# Patient Record
Sex: FEMALE | Race: WHITE | Employment: OTHER | URBAN - METROPOLITAN AREA
[De-identification: names, ages, dates, MRNs, and addresses within clinical notes are randomized per-mention and may not be internally consistent; named-entity substitution may affect disease eponyms.]

---

## 2018-09-24 ENCOUNTER — HOSPITAL ENCOUNTER (EMERGENCY)
Age: 78
Discharge: HOME | End: 2018-09-25
Attending: EMERGENCY MEDICINE
Payer: MEDICARE

## 2018-09-24 ENCOUNTER — APPOINTMENT (OUTPATIENT)
Dept: GENERAL RADIOLOGY | Age: 78
End: 2018-09-24
Payer: MEDICARE

## 2018-09-24 VITALS
SYSTOLIC BLOOD PRESSURE: 148 MMHG | RESPIRATION RATE: 18 BRPM | WEIGHT: 278 LBS | BODY MASS INDEX: 51.16 KG/M2 | DIASTOLIC BLOOD PRESSURE: 68 MMHG | OXYGEN SATURATION: 95 % | HEIGHT: 62 IN | HEART RATE: 76 BPM | TEMPERATURE: 98.7 F

## 2018-09-24 DIAGNOSIS — J40 BRONCHITIS: Primary | ICD-10-CM

## 2018-09-24 LAB
ALBUMIN SERPL-MCNC: 3.1 G/DL (ref 3.5–5.2)
ALP BLD-CCNC: 68 U/L (ref 35–104)
ALT SERPL-CCNC: 16 U/L (ref 0–32)
ANION GAP SERPL CALCULATED.3IONS-SCNC: 9 MMOL/L (ref 7–16)
AST SERPL-CCNC: 22 U/L (ref 0–31)
BASOPHILS ABSOLUTE: 0.03 E9/L (ref 0–0.2)
BASOPHILS RELATIVE PERCENT: 0.4 % (ref 0–2)
BILIRUB SERPL-MCNC: 0.2 MG/DL (ref 0–1.2)
BUN BLDV-MCNC: 29 MG/DL (ref 8–23)
CALCIUM SERPL-MCNC: 8.7 MG/DL (ref 8.6–10.2)
CHLORIDE BLD-SCNC: 102 MMOL/L (ref 98–107)
CO2: 28 MMOL/L (ref 22–29)
CREAT SERPL-MCNC: 1.2 MG/DL (ref 0.5–1)
EOSINOPHILS ABSOLUTE: 0.48 E9/L (ref 0.05–0.5)
EOSINOPHILS RELATIVE PERCENT: 5.6 % (ref 0–6)
GFR AFRICAN AMERICAN: 52
GFR NON-AFRICAN AMERICAN: 43 ML/MIN/1.73
GLUCOSE BLD-MCNC: 82 MG/DL (ref 74–109)
HCT VFR BLD CALC: 32 % (ref 34–48)
HEMOGLOBIN: 9.3 G/DL (ref 11.5–15.5)
IMMATURE GRANULOCYTES #: 0.05 E9/L
IMMATURE GRANULOCYTES %: 0.6 % (ref 0–5)
INR BLD: 2.9
LYMPHOCYTES ABSOLUTE: 2.21 E9/L (ref 1.5–4)
LYMPHOCYTES RELATIVE PERCENT: 25.8 % (ref 20–42)
MCH RBC QN AUTO: 23.7 PG (ref 26–35)
MCHC RBC AUTO-ENTMCNC: 29.1 % (ref 32–34.5)
MCV RBC AUTO: 81.4 FL (ref 80–99.9)
MONOCYTES ABSOLUTE: 0.76 E9/L (ref 0.1–0.95)
MONOCYTES RELATIVE PERCENT: 8.9 % (ref 2–12)
NEUTROPHILS ABSOLUTE: 5.02 E9/L (ref 1.8–7.3)
NEUTROPHILS RELATIVE PERCENT: 58.7 % (ref 43–80)
PDW BLD-RTO: 15.3 FL (ref 11.5–15)
PLATELET # BLD: 285 E9/L (ref 130–450)
PMV BLD AUTO: 11.3 FL (ref 7–12)
POTASSIUM SERPL-SCNC: 4.3 MMOL/L (ref 3.5–5)
PROTHROMBIN TIME: 32.9 SEC (ref 9.3–12.4)
RBC # BLD: 3.93 E12/L (ref 3.5–5.5)
SODIUM BLD-SCNC: 139 MMOL/L (ref 132–146)
STREP GRP A PCR: NEGATIVE
TOTAL PROTEIN: 7.1 G/DL (ref 6.4–8.3)
WBC # BLD: 8.6 E9/L (ref 4.5–11.5)

## 2018-09-24 PROCEDURE — 99283 EMERGENCY DEPT VISIT LOW MDM: CPT

## 2018-09-24 PROCEDURE — 6370000000 HC RX 637 (ALT 250 FOR IP): Performed by: EMERGENCY MEDICINE

## 2018-09-24 PROCEDURE — 80053 COMPREHEN METABOLIC PANEL: CPT

## 2018-09-24 PROCEDURE — 87880 STREP A ASSAY W/OPTIC: CPT

## 2018-09-24 PROCEDURE — 85610 PROTHROMBIN TIME: CPT

## 2018-09-24 PROCEDURE — 71046 X-RAY EXAM CHEST 2 VIEWS: CPT

## 2018-09-24 PROCEDURE — 94664 DEMO&/EVAL PT USE INHALER: CPT

## 2018-09-24 PROCEDURE — 36415 COLL VENOUS BLD VENIPUNCTURE: CPT

## 2018-09-24 PROCEDURE — 85025 COMPLETE CBC W/AUTO DIFF WBC: CPT

## 2018-09-24 RX ORDER — IPRATROPIUM BROMIDE AND ALBUTEROL SULFATE 2.5; .5 MG/3ML; MG/3ML
3 SOLUTION RESPIRATORY (INHALATION) ONCE
Status: COMPLETED | OUTPATIENT
Start: 2018-09-24 | End: 2018-09-24

## 2018-09-24 RX ORDER — ALBUTEROL SULFATE 90 UG/1
2 AEROSOL, METERED RESPIRATORY (INHALATION) EVERY 6 HOURS PRN
Qty: 1 INHALER | Refills: 0 | Status: SHIPPED | OUTPATIENT
Start: 2018-09-24 | End: 2020-01-01

## 2018-09-24 RX ADMIN — IPRATROPIUM BROMIDE AND ALBUTEROL SULFATE 3 AMPULE: .5; 3 SOLUTION RESPIRATORY (INHALATION) at 17:01

## 2018-09-24 ASSESSMENT — ENCOUNTER SYMPTOMS
TROUBLE SWALLOWING: 0
SORE THROAT: 1
COUGH: 1
RHINORRHEA: 0
EYE DISCHARGE: 0
SHORTNESS OF BREATH: 0
WHEEZING: 1
NAUSEA: 0
COLOR CHANGE: 0
VOMITING: 0
STRIDOR: 0
SINUS CONGESTION: 1
ABDOMINAL PAIN: 0
SINUS PRESSURE: 1

## 2018-09-24 NOTE — ED NOTES
Unable to obtain IV access for meds/blood work.  Charge nurse notified     Brooks Bryson, NANCY  09/24/18 8607

## 2018-09-24 NOTE — ED PROVIDER NOTES
Ear: Tympanic membrane and ear canal normal.   Left Ear: Tympanic membrane and ear canal normal.   Mouth/Throat: Oropharynx is clear and moist. No oropharyngeal exudate. Eyes: Conjunctivae are normal.   Neck: Neck supple. Cardiovascular: Normal rate, regular rhythm, normal heart sounds and intact distal pulses. Pulses:       Radial pulses are 2+ on the right side, and 2+ on the left side. Pulmonary/Chest: Effort normal. No respiratory distress. She has wheezes. She has rhonchi. She has no rales. She exhibits no tenderness. Abdominal: Soft. Bowel sounds are normal. There is no tenderness. There is no rebound. Neurological: She is alert and oriented to person, place, and time. Skin: Skin is warm and dry. She is not diaphoretic. Psychiatric: She has a normal mood and affect. Her behavior is normal.   Nursing note and vitals reviewed. Procedures    MDM  Number of Diagnoses or Management Options  Bronchitis:   Diagnosis management comments: Patient presents to the ED for cough. Differential diagnoses included but not limited to pneumonia, bronchitis, fluids and strep pharyngitis. Workup in the ED revealed bronchitis. Patient refused influenza swab. Patient was given breathing treatment for their symptoms with moderate improvement. Blood work and imaging unremarkable. Patient continues to be non-toxic on re-evaluation. Findings were discussed with the patient and reasons to immediately return to the ED were articulated to them. They will follow-up with their PMD.         Amount and/or Complexity of Data Reviewed  Decide to obtain previous medical records or to obtain history from someone other than the patient: yes        ED Course as of Sep 25 0230   Mon Sep 24, 2018   1729 Patient reassessed. Patient states minimal improvement after breathing treatment. Patient in no respiratory distress. She is not using any accessory muscle use.  Lung auscultation demonstrates diminished wheeze in comparison to initial evaluation. [MA]   1956 She reassessed. Patient aware of current results. Patient distress at this time and hemodynamically stable. Patient will be discharged home. [MA]      ED Course User Index  [MA] eLo Joiner, DO       --------------------------------------------- PAST HISTORY ---------------------------------------------  Past Medical History:  has a past medical history of Arthritis; Diabetes mellitus [address] [facility] Hyperlipidemia; and Hypertension. Past Surgical History:  has a past surgical history that includes Tonsillectomy; Knee Arthroplasty (Left, 02/06/2014); and Vena Cava Filter Placement (02/06/2014). Social History:  reports that she has never smoked. She has never used smokeless tobacco. She reports that she does not drink alcohol or use drugs. Family History: family history is not on file. The patients home medications have been reviewed.     Allergies: Sulfa antibiotics    -------------------------------------------------- RESULTS -------------------------------------------------    Lab  Results for orders placed or performed during the hospital encounter of 09/24/18   Strep screen group a throat   Result Value Ref Range    Strep Grp A PCR Negative Negative   CBC auto differential   Result Value Ref Range    WBC 8.6 4.5 - 11.5 E9/L    RBC 3.93 3.50 - 5.50 E12/L    Hemoglobin 9.3 (L) 11.5 - 15.5 g/dL    Hematocrit 32.0 (L) 34.0 - 48.0 %    MCV 81.4 80.0 - 99.9 fL    MCH 23.7 (L) 26.0 - 35.0 pg    MCHC 29.1 (L) 32.0 - 34.5 %    RDW 15.3 (H) 11.5 - 15.0 fL    Platelets 159 635 - 616 E9/L    MPV 11.3 7.0 - 12.0 fL    Neutrophils % 58.7 43.0 - 80.0 %    Immature Granulocytes % 0.6 0.0 - 5.0 %    Lymphocytes % 25.8 20.0 - 42.0 %    Monocytes % 8.9 2.0 - 12.0 %    Eosinophils % 5.6 0.0 - 6.0 %    Basophils % 0.4 0.0 - 2.0 %    Neutrophils # 5.02 1.80 - 7.30 E9/L    Immature Granulocytes # 0.05 E9/L    Lymphocytes # 2.21 1.50 - 4.00 E9/L    Monocytes #

## 2018-09-24 NOTE — ED NOTES
Bed:  [place] [address]  Expected date:   Expected time:   Means of arrival:   Comments:  Dinora Piper RN  09/24/18 9811

## 2018-09-24 NOTE — ED NOTES
Pt refuses nasal swab. Dr. Anibal Myers and Dr. Rajat Messina notified.      Brooks Bryson RN  09/24/18 9114

## 2018-09-25 NOTE — ED NOTES
Spoke to Mary Latham at [facility] at [employer]. She states [facility] cannot pick pt up to transport back to facility. Mary Latham stated she has arranged for [employer] to pick pt up at 3am, which was their earliest availability. Charge nurse notified.          Colton Gonzales, NANCY  09/24/18 0829

## 2020-01-01 ENCOUNTER — APPOINTMENT (OUTPATIENT)
Dept: CT IMAGING | Age: 80
DRG: 683 | End: 2020-01-01
Payer: MEDICARE

## 2020-01-01 ENCOUNTER — HOSPITAL ENCOUNTER (INPATIENT)
Age: 80
LOS: 4 days | Discharge: NURSING, REHAB OR LONG-TERM CARE | DRG: 683 | End: 2020-06-20
Attending: EMERGENCY MEDICINE | Admitting: INTERNAL MEDICINE
Payer: MEDICARE

## 2020-01-01 VITALS
HEIGHT: 62 IN | DIASTOLIC BLOOD PRESSURE: 72 MMHG | BODY MASS INDEX: 40.55 KG/M2 | RESPIRATION RATE: 18 BRPM | TEMPERATURE: 98.2 F | HEART RATE: 75 BPM | WEIGHT: 220.38 LBS | OXYGEN SATURATION: 97 % | SYSTOLIC BLOOD PRESSURE: 176 MMHG

## 2020-01-01 LAB
ANION GAP SERPL CALCULATED.3IONS-SCNC: 15 MMOL/L (ref 7–16)
ANION GAP SERPL CALCULATED.3IONS-SCNC: 18 MMOL/L (ref 7–16)
ANION GAP SERPL CALCULATED.3IONS-SCNC: 28 MMOL/L (ref 7–16)
BACTERIA: ABNORMAL /HPF
BASOPHILS ABSOLUTE: 0.03 E9/L (ref 0–0.2)
BASOPHILS RELATIVE PERCENT: 0.3 % (ref 0–2)
BILIRUBIN URINE: ABNORMAL
BLOOD, URINE: ABNORMAL
BUN BLDV-MCNC: 24 MG/DL (ref 8–23)
BUN BLDV-MCNC: 32 MG/DL (ref 8–23)
BUN BLDV-MCNC: 40 MG/DL (ref 8–23)
BUN BLDV-MCNC: 53 MG/DL (ref 8–23)
BUN BLDV-MCNC: 61 MG/DL (ref 8–23)
CALCIUM SERPL-MCNC: 8.5 MG/DL (ref 8.6–10.2)
CALCIUM SERPL-MCNC: 8.5 MG/DL (ref 8.6–10.2)
CALCIUM SERPL-MCNC: 8.8 MG/DL (ref 8.6–10.2)
CALCIUM SERPL-MCNC: 8.8 MG/DL (ref 8.6–10.2)
CALCIUM SERPL-MCNC: 9.4 MG/DL (ref 8.6–10.2)
CHLORIDE BLD-SCNC: 106 MMOL/L (ref 98–107)
CHLORIDE BLD-SCNC: 107 MMOL/L (ref 98–107)
CHLORIDE BLD-SCNC: 98 MMOL/L (ref 98–107)
CLARITY: ABNORMAL
CO2: 15 MMOL/L (ref 22–29)
CO2: 16 MMOL/L (ref 22–29)
CO2: 19 MMOL/L (ref 22–29)
CO2: 20 MMOL/L (ref 22–29)
CO2: 21 MMOL/L (ref 22–29)
COLOR: YELLOW
CREAT SERPL-MCNC: 1.6 MG/DL (ref 0.5–1)
CREAT SERPL-MCNC: 1.8 MG/DL (ref 0.5–1)
CREAT SERPL-MCNC: 2.5 MG/DL (ref 0.5–1)
CREAT SERPL-MCNC: 2.6 MG/DL (ref 0.5–1)
CREAT SERPL-MCNC: 3.1 MG/DL (ref 0.5–1)
EKG ATRIAL RATE: 74 BPM
EKG P AXIS: 45 DEGREES
EKG P-R INTERVAL: 144 MS
EKG Q-T INTERVAL: 390 MS
EKG QRS DURATION: 68 MS
EKG QTC CALCULATION (BAZETT): 432 MS
EKG R AXIS: 47 DEGREES
EKG T AXIS: 42 DEGREES
EKG VENTRICULAR RATE: 74 BPM
EOSINOPHILS ABSOLUTE: 0.08 E9/L (ref 0.05–0.5)
EOSINOPHILS RELATIVE PERCENT: 0.7 % (ref 0–6)
GFR AFRICAN AMERICAN: 17
GFR AFRICAN AMERICAN: 21
GFR AFRICAN AMERICAN: 22
GFR AFRICAN AMERICAN: 33
GFR AFRICAN AMERICAN: 37
GFR NON-AFRICAN AMERICAN: 14 ML/MIN/1.73
GFR NON-AFRICAN AMERICAN: 18 ML/MIN/1.73
GFR NON-AFRICAN AMERICAN: 18 ML/MIN/1.73
GFR NON-AFRICAN AMERICAN: 27 ML/MIN/1.73
GFR NON-AFRICAN AMERICAN: 31 ML/MIN/1.73
GLUCOSE BLD-MCNC: 100 MG/DL (ref 74–99)
GLUCOSE BLD-MCNC: 107 MG/DL (ref 74–99)
GLUCOSE BLD-MCNC: 111 MG/DL (ref 74–99)
GLUCOSE BLD-MCNC: 113 MG/DL (ref 74–99)
GLUCOSE BLD-MCNC: 87 MG/DL (ref 74–99)
GLUCOSE URINE: NEGATIVE MG/DL
HCT VFR BLD CALC: 39.7 % (ref 34–48)
HCT VFR BLD CALC: 41 % (ref 34–48)
HEMOGLOBIN: 11.1 G/DL (ref 11.5–15.5)
HEMOGLOBIN: 11.8 G/DL (ref 11.5–15.5)
IMMATURE GRANULOCYTES #: 0.1 E9/L
IMMATURE GRANULOCYTES %: 0.9 % (ref 0–5)
INR BLD: 2.2
INR BLD: 3.3
INR BLD: 4.2
INR BLD: 4.3
KETONES, URINE: 15 MG/DL
LEUKOCYTE ESTERASE, URINE: ABNORMAL
LYMPHOCYTES ABSOLUTE: 1.02 E9/L (ref 1.5–4)
LYMPHOCYTES RELATIVE PERCENT: 9.1 % (ref 20–42)
MCH RBC QN AUTO: 25.2 PG (ref 26–35)
MCH RBC QN AUTO: 25.5 PG (ref 26–35)
MCHC RBC AUTO-ENTMCNC: 28 % (ref 32–34.5)
MCHC RBC AUTO-ENTMCNC: 28.8 % (ref 32–34.5)
MCV RBC AUTO: 87.4 FL (ref 80–99.9)
MCV RBC AUTO: 91.3 FL (ref 80–99.9)
METER GLUCOSE: 102 MG/DL (ref 74–99)
METER GLUCOSE: 103 MG/DL (ref 74–99)
METER GLUCOSE: 104 MG/DL (ref 74–99)
METER GLUCOSE: 108 MG/DL (ref 74–99)
METER GLUCOSE: 112 MG/DL (ref 74–99)
METER GLUCOSE: 138 MG/DL (ref 74–99)
METER GLUCOSE: 139 MG/DL (ref 74–99)
METER GLUCOSE: 152 MG/DL (ref 74–99)
METER GLUCOSE: 157 MG/DL (ref 74–99)
METER GLUCOSE: 85 MG/DL (ref 74–99)
METER GLUCOSE: 87 MG/DL (ref 74–99)
METER GLUCOSE: 88 MG/DL (ref 74–99)
METER GLUCOSE: 92 MG/DL (ref 74–99)
METER GLUCOSE: 93 MG/DL (ref 74–99)
MONOCYTES ABSOLUTE: 0.42 E9/L (ref 0.1–0.95)
MONOCYTES RELATIVE PERCENT: 3.8 % (ref 2–12)
NEUTROPHILS ABSOLUTE: 9.55 E9/L (ref 1.8–7.3)
NEUTROPHILS RELATIVE PERCENT: 85.2 % (ref 43–80)
NITRITE, URINE: NEGATIVE
OVALOCYTES: ABNORMAL
PDW BLD-RTO: 16.8 FL (ref 11.5–15)
PDW BLD-RTO: 16.8 FL (ref 11.5–15)
PH UA: 5 (ref 5–9)
PLATELET # BLD: 208 E9/L (ref 130–450)
PLATELET # BLD: 283 E9/L (ref 130–450)
PMV BLD AUTO: 12.7 FL (ref 7–12)
PMV BLD AUTO: 12.9 FL (ref 7–12)
POIKILOCYTES: ABNORMAL
POTASSIUM REFLEX MAGNESIUM: 4 MMOL/L (ref 3.5–5)
POTASSIUM REFLEX MAGNESIUM: 4.2 MMOL/L (ref 3.5–5)
POTASSIUM REFLEX MAGNESIUM: 4.7 MMOL/L (ref 3.5–5)
POTASSIUM REFLEX MAGNESIUM: 5.1 MMOL/L (ref 3.5–5)
POTASSIUM SERPL-SCNC: 3.7 MMOL/L (ref 3.5–5)
PROTEIN UA: 30 MG/DL
PROTHROMBIN TIME: 24.7 SEC (ref 9.3–12.4)
PROTHROMBIN TIME: 38 SEC (ref 9.3–12.4)
PROTHROMBIN TIME: 48.3 SEC (ref 9.3–12.4)
PROTHROMBIN TIME: 49.9 SEC (ref 9.3–12.4)
RBC # BLD: 4.35 E12/L (ref 3.5–5.5)
RBC # BLD: 4.69 E12/L (ref 3.5–5.5)
RBC UA: ABNORMAL /HPF (ref 0–2)
REASON FOR REJECTION: NORMAL
REJECTED TEST: NORMAL
RENAL EPITHELIAL, UA: ABNORMAL /HPF
SARS-COV-2, NAAT: NOT DETECTED
SARS-COV-2, NAAT: NOT DETECTED
SODIUM BLD-SCNC: 140 MMOL/L (ref 132–146)
SODIUM BLD-SCNC: 141 MMOL/L (ref 132–146)
SODIUM BLD-SCNC: 141 MMOL/L (ref 132–146)
SODIUM BLD-SCNC: 142 MMOL/L (ref 132–146)
SODIUM BLD-SCNC: 143 MMOL/L (ref 132–146)
SPECIFIC GRAVITY UA: >=1.03 (ref 1–1.03)
T4 FREE: 1.07 NG/DL (ref 0.93–1.7)
TSH SERPL DL<=0.05 MIU/L-ACNC: 0.4 UIU/ML (ref 0.27–4.2)
UROBILINOGEN, URINE: 1 E.U./DL
WBC # BLD: 11.2 E9/L (ref 4.5–11.5)
WBC # BLD: 7.6 E9/L (ref 4.5–11.5)
WBC UA: ABNORMAL /HPF (ref 0–5)

## 2020-01-01 PROCEDURE — 36415 COLL VENOUS BLD VENIPUNCTURE: CPT

## 2020-01-01 PROCEDURE — 6370000000 HC RX 637 (ALT 250 FOR IP): Performed by: FAMILY MEDICINE

## 2020-01-01 PROCEDURE — 93010 ELECTROCARDIOGRAM REPORT: CPT | Performed by: INTERNAL MEDICINE

## 2020-01-01 PROCEDURE — 2580000003 HC RX 258: Performed by: INTERNAL MEDICINE

## 2020-01-01 PROCEDURE — 82962 GLUCOSE BLOOD TEST: CPT

## 2020-01-01 PROCEDURE — 93005 ELECTROCARDIOGRAM TRACING: CPT | Performed by: EMERGENCY MEDICINE

## 2020-01-01 PROCEDURE — 74176 CT ABD & PELVIS W/O CONTRAST: CPT

## 2020-01-01 PROCEDURE — 80048 BASIC METABOLIC PNL TOTAL CA: CPT

## 2020-01-01 PROCEDURE — 2580000003 HC RX 258: Performed by: FAMILY MEDICINE

## 2020-01-01 PROCEDURE — 85027 COMPLETE CBC AUTOMATED: CPT

## 2020-01-01 PROCEDURE — 1200000000 HC SEMI PRIVATE

## 2020-01-01 PROCEDURE — 97530 THERAPEUTIC ACTIVITIES: CPT

## 2020-01-01 PROCEDURE — 85610 PROTHROMBIN TIME: CPT

## 2020-01-01 PROCEDURE — 6370000000 HC RX 637 (ALT 250 FOR IP): Performed by: INTERNAL MEDICINE

## 2020-01-01 PROCEDURE — U0002 COVID-19 LAB TEST NON-CDC: HCPCS

## 2020-01-01 PROCEDURE — 96360 HYDRATION IV INFUSION INIT: CPT

## 2020-01-01 PROCEDURE — 84443 ASSAY THYROID STIM HORMONE: CPT

## 2020-01-01 PROCEDURE — 97165 OT EVAL LOW COMPLEX 30 MIN: CPT

## 2020-01-01 PROCEDURE — 97161 PT EVAL LOW COMPLEX 20 MIN: CPT

## 2020-01-01 PROCEDURE — 2580000003 HC RX 258: Performed by: EMERGENCY MEDICINE

## 2020-01-01 PROCEDURE — 2500000003 HC RX 250 WO HCPCS: Performed by: FAMILY MEDICINE

## 2020-01-01 PROCEDURE — 99285 EMERGENCY DEPT VISIT HI MDM: CPT

## 2020-01-01 PROCEDURE — 97535 SELF CARE MNGMENT TRAINING: CPT

## 2020-01-01 PROCEDURE — 81001 URINALYSIS AUTO W/SCOPE: CPT

## 2020-01-01 PROCEDURE — 85025 COMPLETE CBC W/AUTO DIFF WBC: CPT

## 2020-01-01 PROCEDURE — 99221 1ST HOSP IP/OBS SF/LOW 40: CPT | Performed by: NURSE PRACTITIONER

## 2020-01-01 PROCEDURE — 84439 ASSAY OF FREE THYROXINE: CPT

## 2020-01-01 RX ORDER — CIPROFLOXACIN 250 MG/1
250 TABLET, FILM COATED ORAL EVERY 12 HOURS SCHEDULED
Status: DISCONTINUED | OUTPATIENT
Start: 2020-01-01 | End: 2020-01-01 | Stop reason: HOSPADM

## 2020-01-01 RX ORDER — SERTRALINE HYDROCHLORIDE 100 MG/1
100 TABLET, FILM COATED ORAL DAILY
Status: DISCONTINUED | OUTPATIENT
Start: 2020-01-01 | End: 2020-01-01

## 2020-01-01 RX ORDER — BISACODYL 10 MG
10 SUPPOSITORY, RECTAL RECTAL ONCE
Status: COMPLETED | OUTPATIENT
Start: 2020-01-01 | End: 2020-01-01

## 2020-01-01 RX ORDER — WARFARIN SODIUM 1 MG/1
1 TABLET ORAL DAILY
Status: DISCONTINUED | OUTPATIENT
Start: 2020-01-01 | End: 2020-01-01

## 2020-01-01 RX ORDER — MEMANTINE HYDROCHLORIDE 14 MG/1
28 CAPSULE, EXTENDED RELEASE ORAL DAILY
COMMUNITY

## 2020-01-01 RX ORDER — ALBUTEROL SULFATE 90 UG/1
2 AEROSOL, METERED RESPIRATORY (INHALATION) EVERY 6 HOURS PRN
COMMUNITY

## 2020-01-01 RX ORDER — BISACODYL 10 MG
10 SUPPOSITORY, RECTAL RECTAL DAILY PRN
Status: DISCONTINUED | OUTPATIENT
Start: 2020-01-01 | End: 2020-01-01 | Stop reason: HOSPADM

## 2020-01-01 RX ORDER — ASCORBIC ACID 500 MG
500 TABLET ORAL DAILY
Status: DISCONTINUED | OUTPATIENT
Start: 2020-01-01 | End: 2020-01-01 | Stop reason: HOSPADM

## 2020-01-01 RX ORDER — PROMETHAZINE HYDROCHLORIDE 25 MG/1
25 TABLET ORAL 4 TIMES DAILY PRN
COMMUNITY

## 2020-01-01 RX ORDER — INSULIN GLARGINE 100 [IU]/ML
8 INJECTION, SOLUTION SUBCUTANEOUS NIGHTLY
Status: ON HOLD | COMMUNITY
End: 2020-01-01 | Stop reason: HOSPADM

## 2020-01-01 RX ORDER — FLUCONAZOLE 100 MG/1
100 TABLET ORAL DAILY
Status: DISCONTINUED | OUTPATIENT
Start: 2020-01-01 | End: 2020-01-01

## 2020-01-01 RX ORDER — SERTRALINE HYDROCHLORIDE 100 MG/1
100 TABLET, FILM COATED ORAL DAILY
Status: ON HOLD | COMMUNITY
End: 2020-01-01 | Stop reason: HOSPADM

## 2020-01-01 RX ORDER — ACETAMINOPHEN 325 MG/1
650 TABLET ORAL EVERY 6 HOURS PRN
Status: DISCONTINUED | OUTPATIENT
Start: 2020-01-01 | End: 2020-01-01 | Stop reason: HOSPADM

## 2020-01-01 RX ORDER — SODIUM CHLORIDE 9 MG/ML
1000 INJECTION, SOLUTION INTRAVENOUS CONTINUOUS
Status: DISCONTINUED | OUTPATIENT
Start: 2020-01-01 | End: 2020-01-01

## 2020-01-01 RX ORDER — 0.9 % SODIUM CHLORIDE 0.9 %
1000 INTRAVENOUS SOLUTION INTRAVENOUS ONCE
Status: COMPLETED | OUTPATIENT
Start: 2020-01-01 | End: 2020-01-01

## 2020-01-01 RX ORDER — FUROSEMIDE 20 MG/1
20 TABLET ORAL DAILY
Status: ON HOLD | COMMUNITY
End: 2020-01-01 | Stop reason: HOSPADM

## 2020-01-01 RX ORDER — PANTOPRAZOLE SODIUM 40 MG/1
40 TABLET, DELAYED RELEASE ORAL EVERY OTHER DAY
Status: DISCONTINUED | OUTPATIENT
Start: 2020-01-01 | End: 2020-01-01 | Stop reason: HOSPADM

## 2020-01-01 RX ORDER — QUETIAPINE FUMARATE 25 MG/1
25 TABLET, FILM COATED ORAL DAILY
Status: DISCONTINUED | OUTPATIENT
Start: 2020-01-01 | End: 2020-01-01

## 2020-01-01 RX ORDER — SODIUM CHLORIDE 9 MG/ML
INJECTION, SOLUTION INTRAVENOUS CONTINUOUS
Status: DISCONTINUED | OUTPATIENT
Start: 2020-01-01 | End: 2020-01-01

## 2020-01-01 RX ORDER — PROMETHAZINE HYDROCHLORIDE 25 MG/1
25 TABLET ORAL 4 TIMES DAILY PRN
Status: DISCONTINUED | OUTPATIENT
Start: 2020-01-01 | End: 2020-01-01 | Stop reason: HOSPADM

## 2020-01-01 RX ORDER — CALCIUM CARBONATE 200(500)MG
2 TABLET,CHEWABLE ORAL EVERY 4 HOURS PRN
Status: DISCONTINUED | OUTPATIENT
Start: 2020-01-01 | End: 2020-01-01 | Stop reason: HOSPADM

## 2020-01-01 RX ORDER — SIMVASTATIN 20 MG
20 TABLET ORAL NIGHTLY
Status: ON HOLD | COMMUNITY
End: 2020-01-01 | Stop reason: HOSPADM

## 2020-01-01 RX ORDER — POLYETHYLENE GLYCOL 3350 17 G/17G
17 POWDER, FOR SOLUTION ORAL 2 TIMES DAILY
Status: DISCONTINUED | OUTPATIENT
Start: 2020-01-01 | End: 2020-01-01 | Stop reason: HOSPADM

## 2020-01-01 RX ORDER — ALBUTEROL SULFATE 2.5 MG/3ML
2.5 SOLUTION RESPIRATORY (INHALATION) EVERY 6 HOURS PRN
Status: DISCONTINUED | OUTPATIENT
Start: 2020-01-01 | End: 2020-01-01 | Stop reason: HOSPADM

## 2020-01-01 RX ORDER — ACETAMINOPHEN 650 MG/1
650 SUPPOSITORY RECTAL EVERY 6 HOURS PRN
Status: DISCONTINUED | OUTPATIENT
Start: 2020-01-01 | End: 2020-01-01 | Stop reason: HOSPADM

## 2020-01-01 RX ORDER — SODIUM CHLORIDE 0.9 % (FLUSH) 0.9 %
10 SYRINGE (ML) INJECTION EVERY 12 HOURS SCHEDULED
Status: DISCONTINUED | OUTPATIENT
Start: 2020-01-01 | End: 2020-01-01 | Stop reason: HOSPADM

## 2020-01-01 RX ORDER — DEXTROSE MONOHYDRATE 50 MG/ML
100 INJECTION, SOLUTION INTRAVENOUS PRN
Status: DISCONTINUED | OUTPATIENT
Start: 2020-01-01 | End: 2020-01-01 | Stop reason: HOSPADM

## 2020-01-01 RX ORDER — MEMANTINE HYDROCHLORIDE 10 MG/1
10 TABLET ORAL 2 TIMES DAILY
Status: DISCONTINUED | OUTPATIENT
Start: 2020-01-01 | End: 2020-01-01 | Stop reason: HOSPADM

## 2020-01-01 RX ORDER — LOSARTAN POTASSIUM 50 MG/1
100 TABLET ORAL DAILY
Status: DISCONTINUED | OUTPATIENT
Start: 2020-01-01 | End: 2020-01-01 | Stop reason: HOSPADM

## 2020-01-01 RX ORDER — INSULIN GLARGINE 100 [IU]/ML
10 INJECTION, SOLUTION SUBCUTANEOUS NIGHTLY
Status: DISCONTINUED | OUTPATIENT
Start: 2020-01-01 | End: 2020-01-01

## 2020-01-01 RX ORDER — QUETIAPINE FUMARATE 25 MG/1
25 TABLET, FILM COATED ORAL DAILY
Status: ON HOLD | COMMUNITY
End: 2020-01-01 | Stop reason: HOSPADM

## 2020-01-01 RX ORDER — DIVALPROEX SODIUM 125 MG/1
125 TABLET, DELAYED RELEASE ORAL 2 TIMES DAILY
Status: DISCONTINUED | OUTPATIENT
Start: 2020-01-01 | End: 2020-01-01 | Stop reason: HOSPADM

## 2020-01-01 RX ORDER — POLYETHYLENE GLYCOL 3350 17 G/17G
17 POWDER, FOR SOLUTION ORAL DAILY PRN
Status: DISCONTINUED | OUTPATIENT
Start: 2020-01-01 | End: 2020-01-01

## 2020-01-01 RX ORDER — DIVALPROEX SODIUM 125 MG/1
125 TABLET, DELAYED RELEASE ORAL 2 TIMES DAILY
COMMUNITY

## 2020-01-01 RX ORDER — NICOTINE POLACRILEX 4 MG
15 LOZENGE BUCCAL PRN
Status: ON HOLD | COMMUNITY
End: 2020-01-01 | Stop reason: HOSPADM

## 2020-01-01 RX ORDER — NYSTATIN 100000 [USP'U]/G
1 POWDER TOPICAL 2 TIMES DAILY
COMMUNITY

## 2020-01-01 RX ORDER — WARFARIN SODIUM 3 MG/1
1.5 TABLET ORAL DAILY
Status: DISCONTINUED | OUTPATIENT
Start: 2020-01-01 | End: 2020-01-01

## 2020-01-01 RX ORDER — ACETAMINOPHEN 325 MG/1
650 TABLET ORAL EVERY 4 HOURS PRN
Status: DISCONTINUED | OUTPATIENT
Start: 2020-01-01 | End: 2020-01-01 | Stop reason: HOSPADM

## 2020-01-01 RX ORDER — ATORVASTATIN CALCIUM 10 MG/1
10 TABLET, FILM COATED ORAL DAILY
Status: DISCONTINUED | OUTPATIENT
Start: 2020-01-01 | End: 2020-01-01 | Stop reason: HOSPADM

## 2020-01-01 RX ORDER — QUETIAPINE FUMARATE 25 MG/1
12.5 TABLET, FILM COATED ORAL DAILY
COMMUNITY

## 2020-01-01 RX ORDER — LOSARTAN POTASSIUM 100 MG/1
100 TABLET ORAL DAILY
COMMUNITY

## 2020-01-01 RX ORDER — SODIUM CHLORIDE 0.9 % (FLUSH) 0.9 %
10 SYRINGE (ML) INJECTION PRN
Status: DISCONTINUED | OUTPATIENT
Start: 2020-01-01 | End: 2020-01-01 | Stop reason: HOSPADM

## 2020-01-01 RX ORDER — WARFARIN SODIUM 1 MG/1
1 TABLET ORAL DAILY
Qty: 30 TABLET | Refills: 3 | DISCHARGE
Start: 2020-01-01

## 2020-01-01 RX ORDER — WARFARIN SODIUM 1 MG/1
1.5 TABLET ORAL DAILY
Status: ON HOLD | COMMUNITY
End: 2020-01-01 | Stop reason: SDUPTHER

## 2020-01-01 RX ORDER — DEXTROSE MONOHYDRATE 25 G/50ML
12.5 INJECTION, SOLUTION INTRAVENOUS PRN
Status: DISCONTINUED | OUTPATIENT
Start: 2020-01-01 | End: 2020-01-01 | Stop reason: HOSPADM

## 2020-01-01 RX ORDER — BISACODYL 10 MG
10 SUPPOSITORY, RECTAL RECTAL DAILY PRN
COMMUNITY

## 2020-01-01 RX ORDER — POLYETHYLENE GLYCOL 3350 17 G/17G
17 POWDER, FOR SOLUTION ORAL EVERY 12 HOURS PRN
COMMUNITY

## 2020-01-01 RX ORDER — INSULIN GLARGINE 100 [IU]/ML
10 INJECTION, SOLUTION SUBCUTANEOUS NIGHTLY
Status: ON HOLD | COMMUNITY
End: 2020-01-01 | Stop reason: HOSPADM

## 2020-01-01 RX ORDER — CIPROFLOXACIN 250 MG/1
250 TABLET, FILM COATED ORAL EVERY 12 HOURS SCHEDULED
Qty: 8 TABLET | Refills: 0 | DISCHARGE
Start: 2020-01-01 | End: 2020-01-01

## 2020-01-01 RX ORDER — CALCIUM CARBONATE 200(500)MG
2 TABLET,CHEWABLE ORAL EVERY 4 HOURS PRN
COMMUNITY

## 2020-01-01 RX ORDER — NICOTINE POLACRILEX 4 MG
15 LOZENGE BUCCAL PRN
Status: DISCONTINUED | OUTPATIENT
Start: 2020-01-01 | End: 2020-01-01 | Stop reason: HOSPADM

## 2020-01-01 RX ORDER — QUETIAPINE FUMARATE 25 MG/1
12.5 TABLET, FILM COATED ORAL DAILY
Status: DISCONTINUED | OUTPATIENT
Start: 2020-01-01 | End: 2020-01-01 | Stop reason: HOSPADM

## 2020-01-01 RX ORDER — FLUCONAZOLE 100 MG/1
100 TABLET ORAL DAILY
Status: ON HOLD | COMMUNITY
Start: 2020-01-01 | End: 2020-01-01 | Stop reason: HOSPADM

## 2020-01-01 RX ORDER — ACETAMINOPHEN 325 MG/1
650 TABLET ORAL EVERY 4 HOURS PRN
COMMUNITY

## 2020-01-01 RX ORDER — SODIUM CHLORIDE, SODIUM LACTATE, POTASSIUM CHLORIDE, CALCIUM CHLORIDE 600; 310; 30; 20 MG/100ML; MG/100ML; MG/100ML; MG/100ML
1000 INJECTION, SOLUTION INTRAVENOUS ONCE
Status: COMPLETED | OUTPATIENT
Start: 2020-01-01 | End: 2020-01-01

## 2020-01-01 RX ORDER — CIPROFLOXACIN 250 MG/1
250 TABLET, FILM COATED ORAL
Status: DISCONTINUED | OUTPATIENT
Start: 2020-01-01 | End: 2020-01-01

## 2020-01-01 RX ORDER — ASCORBIC ACID 500 MG
500 TABLET ORAL DAILY
COMMUNITY

## 2020-01-01 RX ORDER — POLYETHYLENE GLYCOL 3350 17 G/17G
17 POWDER, FOR SOLUTION ORAL EVERY 12 HOURS PRN
Status: DISCONTINUED | OUTPATIENT
Start: 2020-01-01 | End: 2020-01-01

## 2020-01-01 RX ORDER — PANTOPRAZOLE SODIUM 40 MG/1
40 TABLET, DELAYED RELEASE ORAL EVERY OTHER DAY
COMMUNITY

## 2020-01-01 RX ADMIN — SODIUM CHLORIDE 1000 ML: 9 INJECTION, SOLUTION INTRAVENOUS at 16:50

## 2020-01-01 RX ADMIN — LOSARTAN POTASSIUM 100 MG: 50 TABLET, FILM COATED ORAL at 15:48

## 2020-01-01 RX ADMIN — MICONAZOLE NITRATE: 20.6 POWDER TOPICAL at 11:02

## 2020-01-01 RX ADMIN — Medication 500 MG: at 09:38

## 2020-01-01 RX ADMIN — POLYETHYLENE GLYCOL 3350 17 G: 17 POWDER, FOR SOLUTION ORAL at 09:39

## 2020-01-01 RX ADMIN — ATORVASTATIN CALCIUM 10 MG: 10 TABLET, FILM COATED ORAL at 11:02

## 2020-01-01 RX ADMIN — FLUCONAZOLE 100 MG: 100 TABLET ORAL at 11:01

## 2020-01-01 RX ADMIN — ATORVASTATIN CALCIUM 10 MG: 10 TABLET, FILM COATED ORAL at 09:38

## 2020-01-01 RX ADMIN — CIPROFLOXACIN HYDROCHLORIDE 250 MG: 250 TABLET, FILM COATED ORAL at 20:34

## 2020-01-01 RX ADMIN — MEMANTINE 10 MG: 10 TABLET ORAL at 20:34

## 2020-01-01 RX ADMIN — LOSARTAN POTASSIUM 100 MG: 50 TABLET, FILM COATED ORAL at 09:09

## 2020-01-01 RX ADMIN — SODIUM CHLORIDE 1000 ML: 9 INJECTION, SOLUTION INTRAVENOUS at 14:51

## 2020-01-01 RX ADMIN — SERTRALINE HYDROCHLORIDE 100 MG: 100 TABLET, FILM COATED ORAL at 09:38

## 2020-01-01 RX ADMIN — FLUCONAZOLE 100 MG: 100 TABLET ORAL at 09:58

## 2020-01-01 RX ADMIN — PANTOPRAZOLE SODIUM 40 MG: 40 TABLET, DELAYED RELEASE ORAL at 09:38

## 2020-01-01 RX ADMIN — MEMANTINE 10 MG: 10 TABLET ORAL at 09:38

## 2020-01-01 RX ADMIN — INSULIN GLARGINE 10 UNITS: 100 INJECTION, SOLUTION SUBCUTANEOUS at 23:25

## 2020-01-01 RX ADMIN — MEMANTINE 10 MG: 10 TABLET ORAL at 11:01

## 2020-01-01 RX ADMIN — MEMANTINE 10 MG: 10 TABLET ORAL at 09:09

## 2020-01-01 RX ADMIN — MEMANTINE 10 MG: 10 TABLET ORAL at 21:41

## 2020-01-01 RX ADMIN — WARFARIN SODIUM 1 MG: 1 TABLET ORAL at 18:40

## 2020-01-01 RX ADMIN — CIPROFLOXACIN HYDROCHLORIDE 250 MG: 250 TABLET, FILM COATED ORAL at 09:17

## 2020-01-01 RX ADMIN — CIPROFLOXACIN HYDROCHLORIDE 250 MG: 250 TABLET, FILM COATED ORAL at 21:41

## 2020-01-01 RX ADMIN — MICONAZOLE NITRATE: 20.6 POWDER TOPICAL at 21:39

## 2020-01-01 RX ADMIN — SODIUM CHLORIDE: 9 INJECTION, SOLUTION INTRAVENOUS at 23:21

## 2020-01-01 RX ADMIN — QUETIAPINE FUMARATE 12.5 MG: 25 TABLET ORAL at 11:02

## 2020-01-01 RX ADMIN — DIVALPROEX SODIUM 125 MG: 125 TABLET, DELAYED RELEASE ORAL at 23:19

## 2020-01-01 RX ADMIN — DIVALPROEX SODIUM 125 MG: 125 TABLET, DELAYED RELEASE ORAL at 11:01

## 2020-01-01 RX ADMIN — QUETIAPINE FUMARATE 12.5 MG: 25 TABLET ORAL at 09:38

## 2020-01-01 RX ADMIN — INSULIN LISPRO 2 UNITS: 100 INJECTION, SOLUTION INTRAVENOUS; SUBCUTANEOUS at 12:53

## 2020-01-01 RX ADMIN — DIVALPROEX SODIUM 125 MG: 125 TABLET, DELAYED RELEASE ORAL at 09:59

## 2020-01-01 RX ADMIN — DIVALPROEX SODIUM 125 MG: 125 TABLET, DELAYED RELEASE ORAL at 09:38

## 2020-01-01 RX ADMIN — DIVALPROEX SODIUM 125 MG: 125 TABLET, DELAYED RELEASE ORAL at 21:40

## 2020-01-01 RX ADMIN — MICONAZOLE NITRATE: 20.6 POWDER TOPICAL at 20:36

## 2020-01-01 RX ADMIN — POLYETHYLENE GLYCOL 3350 17 G: 17 POWDER, FOR SOLUTION ORAL at 10:02

## 2020-01-01 RX ADMIN — Medication 500 MG: at 09:09

## 2020-01-01 RX ADMIN — CIPROFLOXACIN HYDROCHLORIDE 250 MG: 250 TABLET, FILM COATED ORAL at 21:40

## 2020-01-01 RX ADMIN — MEMANTINE 10 MG: 10 TABLET ORAL at 23:18

## 2020-01-01 RX ADMIN — PANTOPRAZOLE SODIUM 40 MG: 40 TABLET, DELAYED RELEASE ORAL at 09:58

## 2020-01-01 RX ADMIN — DIVALPROEX SODIUM 125 MG: 125 TABLET, DELAYED RELEASE ORAL at 21:41

## 2020-01-01 RX ADMIN — MICONAZOLE NITRATE: 20.6 POWDER TOPICAL at 21:42

## 2020-01-01 RX ADMIN — SERTRALINE HYDROCHLORIDE 100 MG: 100 TABLET, FILM COATED ORAL at 11:01

## 2020-01-01 RX ADMIN — POLYETHYLENE GLYCOL 3350 17 G: 17 POWDER, FOR SOLUTION ORAL at 09:09

## 2020-01-01 RX ADMIN — SODIUM CHLORIDE: 9 INJECTION, SOLUTION INTRAVENOUS at 03:05

## 2020-01-01 RX ADMIN — MEMANTINE 10 MG: 10 TABLET ORAL at 21:40

## 2020-01-01 RX ADMIN — POLYETHYLENE GLYCOL 3350 17 G: 17 POWDER, FOR SOLUTION ORAL at 20:35

## 2020-01-01 RX ADMIN — QUETIAPINE FUMARATE 12.5 MG: 25 TABLET ORAL at 09:19

## 2020-01-01 RX ADMIN — SODIUM CHLORIDE: 9 INJECTION, SOLUTION INTRAVENOUS at 10:10

## 2020-01-01 RX ADMIN — WARFARIN SODIUM 1.5 MG: 3 TABLET ORAL at 17:31

## 2020-01-01 RX ADMIN — Medication 10 ML: at 20:33

## 2020-01-01 RX ADMIN — ATORVASTATIN CALCIUM 10 MG: 10 TABLET, FILM COATED ORAL at 09:18

## 2020-01-01 RX ADMIN — Medication 10 ML: at 21:42

## 2020-01-01 RX ADMIN — INSULIN LISPRO 2 UNITS: 100 INJECTION, SOLUTION INTRAVENOUS; SUBCUTANEOUS at 17:32

## 2020-01-01 RX ADMIN — CIPROFLOXACIN HYDROCHLORIDE 250 MG: 250 TABLET, FILM COATED ORAL at 23:18

## 2020-01-01 RX ADMIN — MICONAZOLE NITRATE: 20.6 POWDER TOPICAL at 23:21

## 2020-01-01 RX ADMIN — Medication 500 MG: at 11:02

## 2020-01-01 RX ADMIN — ATORVASTATIN CALCIUM 10 MG: 10 TABLET, FILM COATED ORAL at 09:58

## 2020-01-01 RX ADMIN — Medication 500 MG: at 09:59

## 2020-01-01 RX ADMIN — MICONAZOLE NITRATE: 20.6 POWDER TOPICAL at 10:00

## 2020-01-01 RX ADMIN — SERTRALINE HYDROCHLORIDE 100 MG: 100 TABLET, FILM COATED ORAL at 09:58

## 2020-01-01 RX ADMIN — SODIUM CHLORIDE: 9 INJECTION, SOLUTION INTRAVENOUS at 03:15

## 2020-01-01 RX ADMIN — Medication 10 ML: at 09:22

## 2020-01-01 RX ADMIN — QUETIAPINE FUMARATE 25 MG: 25 TABLET ORAL at 11:46

## 2020-01-01 RX ADMIN — DIVALPROEX SODIUM 125 MG: 125 TABLET, DELAYED RELEASE ORAL at 09:09

## 2020-01-01 RX ADMIN — Medication 10 ML: at 23:21

## 2020-01-01 RX ADMIN — MEMANTINE 10 MG: 10 TABLET ORAL at 09:58

## 2020-01-01 RX ADMIN — SODIUM CHLORIDE, POTASSIUM CHLORIDE, SODIUM LACTATE AND CALCIUM CHLORIDE 1000 ML: 600; 310; 30; 20 INJECTION, SOLUTION INTRAVENOUS at 12:37

## 2020-01-01 RX ADMIN — SERTRALINE HYDROCHLORIDE 50 MG: 50 TABLET, FILM COATED ORAL at 09:18

## 2020-01-01 RX ADMIN — BISACODYL 10 MG: 10 SUPPOSITORY RECTAL at 09:59

## 2020-01-01 RX ADMIN — MICONAZOLE NITRATE: 20.6 POWDER TOPICAL at 09:39

## 2020-01-01 RX ADMIN — DIVALPROEX SODIUM 125 MG: 125 TABLET, DELAYED RELEASE ORAL at 20:34

## 2020-01-01 RX ADMIN — MICONAZOLE NITRATE: 20.6 POWDER TOPICAL at 09:20

## 2020-01-01 ASSESSMENT — PAIN SCALES - GENERAL
PAINLEVEL_OUTOF10: 0
PAINLEVEL_OUTOF10: 0

## 2020-06-16 PROBLEM — N17.9 ACUTE KIDNEY INJURY (HCC): Status: ACTIVE | Noted: 2020-01-01

## 2020-06-16 PROBLEM — N17.9 ACUTE KIDNEY INJURY (NONTRAUMATIC) (HCC): Status: ACTIVE | Noted: 2020-01-01

## 2020-06-16 PROBLEM — I10 ESSENTIAL HYPERTENSION: Chronic | Status: ACTIVE | Noted: 2020-01-01

## 2020-06-16 PROBLEM — F02.818 ALZHEIMER'S DEMENTIA WITH BEHAVIORAL DISTURBANCE (HCC): Chronic | Status: ACTIVE | Noted: 2020-01-01

## 2020-06-16 PROBLEM — E11.9 TYPE 2 DIABETES MELLITUS (HCC): Chronic | Status: ACTIVE | Noted: 2020-01-01

## 2020-06-16 PROBLEM — G30.9 ALZHEIMER'S DEMENTIA WITH BEHAVIORAL DISTURBANCE (HCC): Chronic | Status: ACTIVE | Noted: 2020-01-01

## 2020-06-16 PROBLEM — N30.00 ACUTE CYSTITIS WITHOUT HEMATURIA: Status: ACTIVE | Noted: 2020-01-01

## 2020-06-16 PROBLEM — E86.0 DEHYDRATION: Status: ACTIVE | Noted: 2020-01-01

## 2020-06-16 PROBLEM — N17.9 ACUTE KIDNEY INJURY (NONTRAUMATIC) (HCC): Status: RESOLVED | Noted: 2020-01-01 | Resolved: 2020-01-01

## 2020-06-16 NOTE — ED PROVIDER NOTES
antibiotics        ---------------------------------------------------PHYSICAL EXAM--------------------------------------    Constitutional/General: Alert and oriented x 1  Head: Normocephalic and atraumatic  Eyes: PERRL, EOMI, sclera non icteric  Mouth: Oropharynx clear, handling secretions, no trismus, no asymmetry of the posterior oropharynx or uvular edema  Neck: Supple, full ROM, no stridor, no meningeal signs  Respiratory: Lungs clear to auscultation bilaterally, no wheezes, rales, or rhonchi. Not in respiratory distress  Cardiovascular:  Regular rate. Regular rhythm. No murmurs, no aortic murmurs, no gallops, or rubs. 2+ distal pulses. Equal extremity pulses. Chest: No chest wall tenderness  GI:  Abdomen Soft, diffuse tenderness, Non distended. No rebound, guarding, or rigidity. No pulsatile masses. Musculoskeletal: Moves all extremities x 4. Warm and well perfused, no clubbing, cyanosis, or edema. Capillary refill <3 seconds  Integument: skin warm and dry. No rashes. Neurologic: GCS 14, no focal deficits, symmetric strength 5/5 in the upper and lower extremities bilaterally  Psychiatric: Normal Affect          -------------------------------------------------- RESULTS -------------------------------------------------  I have personally reviewed all laboratory and imaging results for this patient. Results are listed below.      LABS: (Lab results interpreted by me)  Results for orders placed or performed during the hospital encounter of 06/16/20   CBC Auto Differential   Result Value Ref Range    WBC 11.2 4.5 - 11.5 E9/L    RBC 4.69 3.50 - 5.50 E12/L    Hemoglobin 11.8 11.5 - 15.5 g/dL    Hematocrit 41.0 34.0 - 48.0 %    MCV 87.4 80.0 - 99.9 fL    MCH 25.2 (L) 26.0 - 35.0 pg    MCHC 28.8 (L) 32.0 - 34.5 %    RDW 16.8 (H) 11.5 - 15.0 fL    Platelets 486 627 - 041 E9/L    MPV 12.7 (H) 7.0 - 12.0 fL    Neutrophils % 85.2 (H) 43.0 - 80.0 %    Immature Granulocytes % 0.9 0.0 - 5.0 %    Lymphocytes % 9.1 (L)

## 2020-06-17 NOTE — H&P
Guicho Hawkins History and Physical      CHIEF COMPLAINT:  Not eating or drinking, combative    History Obtained From:  Medical record and patient    HISTORY OF PRESENT ILLNESS:    The patient is a 78 y.o. female with significant past medical history of diabetes mellitus and dementia who presents with reported abnormal kidney function at a nursing facility, noted on laboratory testing 2 days ago. The complaint has been persistent, mild in severity, and worsened by nothing. Patient resides at a nursing facility, is nonambulatory. Nursing staff had concerns that patient was not eating and drinking, refusing to drink and having more aggressive behaviors. Laboratory testing showed worsening kidney function, when results were received yesterday, patient was transported to ED. Patient states she is nauseous but denies any abdominal pain, no chest pain or shortness of breath. There has been no recent fevers, no recent cough, no recent vomiting or diarrhea. Past Medical History:     has a past medical history of Arthritis, Diabetes mellitus ([facility] [address] DVT (deep venous thrombosis) ([facility] [address] (H/O ,11/29/12 D/T FALL, ), Hyperlipidemia, and Hypertension. Past Surgical History:     has a past surgical history that includes Tonsillectomy; Knee Arthroplasty (Left, 02/06/2014); and Vena Cava Filter Placement (02/06/2014).     Medications Prior to Admission:    Medications Prior to Admission: warfarin (COUMADIN) 1 MG tablet, Take 1.5 mg by mouth daily  losartan (COZAAR) 100 MG tablet, Take 100 mg by mouth daily  divalproex (DEPAKOTE) 125 MG DR tablet, Take 125 mg by mouth 2 times daily  fluconazole (DIFLUCAN) 100 MG tablet, Take 100 mg by mouth daily  furosemide (LASIX) 20 MG tablet, Take 20 mg by mouth daily  memantine ER (NAMENDA XR) 14 MG CP24 extended release capsule, Take 28 mg by mouth daily  insulin aspart (NOVOLOG) 100 UNIT/ML injection vial, Inject 0-10 Units into the skin 3 times daily (before meals) *Per Sliding

## 2020-06-17 NOTE — CONSULTS
any living relatives. We discussed role of palliative medicine. At this time, Donna Shrestha wishes to continue DNR CCA with current medical management and would like to reassess to see if patient has had any improvement tomorrow. She is not quite ready to discuss hospice however will address tomorrow based on patient's condition. Donna Shrestha states she talk to patient earlier today and patient stated that she is trying to eat more. Donna Shrestha is hopeful that patient will improve and can return to nursing facility. Support provided and questions answered. We will continue to follow. - Family Meeting:   Participants:No family meeting held today       -Surrogate/Legal NOK: HCPOA and Friend    Contacts: Michele Plascencia, [phone]     Past Medical History:   Diagnosis Date    Arthritis     OSTEO    Diabetes mellitus ([facility] [address]     -130, DROPS  AT LUNCH AND DINNER    DVT (deep venous thrombosis) ([facility] [address] H/O ,11/29/12 D/T FALL,      LEFT LEG, ON ANTICOAGULATION RX.    Hyperlipidemia     Hypertension     RUNS 7[address]'E SYSTOLIC  TO 57-42 DIASTOLIC       Past Surgical History:   Procedure Laterality Date    KNEE ARTHROPLASTY Left 02/06/2014    with Dr. Mian Logan  02/06/2014    [place]       History reviewed. No pertinent family history. Social history:   status: unknown  Marital status: no  Living status: nursing facility  Work history: unknown    Allergies   Allergen Reactions    Sulfa Antibiotics      Unable to state reaction       ROS: UNLESS STATED PATIENT DENIES:  CONSTITUTIONAL:  fever, chill, rigors, nausea, vomiting, fatigue. HEENT: blurry vision, double vision, hearing problem, tinnitus, hoarseness, dysphagia, odynophagia  RESPIRATORY: cough, shortness of breath, sputum expectoration. CARDIOVASCULAR:  Chest pain/pressure, palpitation, syncope, irregular beats  GASTROINTESTINAL:  abdominal or rectal pain, diarrhea, constipation, .   GENITOURINARY:  Burning,

## 2020-06-17 NOTE — PLAN OF CARE
Problem: Skin Integrity:  Goal: Will show no infection signs and symptoms  Description: Will show no infection signs and symptoms  6/17/2020 1123 by Philipp Arceo RN  Outcome: Met This Shift  6/17/2020 0152 by Olinda Rinaldi RN  Outcome: Met This Shift     Problem: Skin Integrity:  Goal: Absence of new skin breakdown  Description: Absence of new skin breakdown  6/17/2020 1123 by Philipp Arceo RN  Outcome: Met This Shift  6/17/2020 0152 by Olinda Rinaldi RN  Outcome: Met This Shift     Problem: Safety:  Goal: Free from accidental physical injury  Description: Free from accidental physical injury  6/17/2020 1123 by Philipp Arceo RN  Outcome: Met This Shift  6/17/2020 0152 by Olinda Rinaldi RN  Outcome: Met This Shift

## 2020-06-18 NOTE — CARE COORDINATION
Per Nettie Diver at [address] the patient is a long term pt there at the facility and is a bed hold. The patient is not eating and palliative care is on board. advananced directives living well and hc poa the patient is a dnr cc. Per pt [facility] 7/24. Chiara Preciado Plan is for the patient to return to [facility] once medically stable.  I will follow

## 2020-06-18 NOTE — PROGRESS NOTES
Uses ihsa lift at baseline   Functional Mobility NT  Uses w/c at baseline and does not self propel   Balance Sitting: NT     Standing: NT         Activity Tolerance Fair-  Fair+   Visual/  Perceptual Glasses: Yes                  Hand dominance: R     Strength ROM Additional Info:    RUE  4-/5  Grossly WFL good  and wfl FMC/dexterity noted during ADL tasks       LUE 4-/5  Grossly WFL good  and wfl FMC/dexterity noted during ADL tasks         Hearing: WFL   Sensation:  No c/o numbness or tingling   Tone: WFL   Edema: none noted            Treatment: Upon arrival, patient lying in bed and agreeable to OT session at this time in collaboration with PT. Therapist facilitated bed mobility(rolling to assist with hygiene/clothing management for toileting task--cueing for hand placement). Therapist facilitated self-care retraining: UB/LB self-care tasks(donned/doffed socks), toileting task(assist with hygiene/clothing management and replacement of pad underneath) and grooming task while educating pt on modified techniques, posture, safety and energy conservation techniques. Skilled monitoring of HR, O2 sats and pts response to treatment. At end of session, patient lying in bed(skilled re-positioning for function/comfort and to prevent skin breakdown) with call light and phone within reach, all lines and tubes intact. Comments:  Overall pt demonstrated decreased independence and safety during completion of ADL/functional transfers/mobility tasks. Pt demonstrating fair understanding of education/techniques, requiring additional training / education. Pt would benefit from continued skilled OT to increase functional independence and quality of life.       Eval Complexity: Low    (Evaluation time includes thorough review of current medical information, gathering information on past medical history/social history and prior level of function, completion of standardized testing/informal observation of

## 2020-06-18 NOTE — PROGRESS NOTES
Physical Therapy  Physical Therapy Initial Assessment     Name: Nesha Stephenson  : 1940  MRN: [id number]    Referring Provider:  Justin Dwyer MD    Date of Service: 2020    Evaluating PT:  Ellen Branch PT, DPT. [id number]    Room #:  6244/1528-X  Diagnosis:  BYRON   Reason for admission:  Poor po intake, combative behavior   Precautions:  Falls, dementia  Pertinent PMHx: dementia, HTN, HLD, DM, arthritis, DVT  Procedures: none  Equipment Recommendations:  Return to use of isha lift and wheelchair    SUBJECTIVE:  Pt admitted from [facility]. Dependent isha lift <> wheelchair at baseline. OBJECTIVE:   Initial Evaluation  Date:  Treatment   Short Term/ Long Term   Goals   AM-PAC 6 Clicks      Was pt agreeable to Eval/treatment? Yes      Does pt have pain? LEs with touch      Bed Mobility  Rolling: MaxA  Supine to sit: NT  Sit to supine: NT  Scooting: Dependent x2  MaxA   Transfers Sit to stand: NT  Stand to sit: NT  Stand pivot: NT  NA   Ambulation   NT    NA   Stair negotiation: ascended and descended  NT  NA   ROM BUE:  See OT eval   BLE:  WFL     Strength BUE:  See OT eval   BLE: NT  Increase by 1/3 MMT grade    Balance Sitting EOB:  NT  Dynamic Standing:  NT  Sitting EOB:  MaxA  Dynamic Standing:  NA     -Pt is A & O to self, place, and year. Not month.   -Sensation:  unremarkable   -Edema:  unremarkable     Therapeutic Exercises:  functional activity     Patient education  Pt educated on safety, sequencing of transfers, and role of PT    Patient response to education:   Pt verbalized understanding Pt demonstrated skill Pt requires further education in this area   Yes  No  No      ASSESSMENT:    Comments:  Pt received supine in bed and agreeable to PT session with OT collaboration. Pt incontinent of BM, rolling completed L and R multiple times for hygiene and linen change. Able to reach with UE to assist with rolling. C/o pain to LEs with touch.  States not sitting up to EOB at

## 2020-06-18 NOTE — PROGRESS NOTES
Chart reviewed. Spoke to patient's Orien Officer. Vi Gonzalez is still not ready to discuss Hospice and wants to continue current management. Vi Gonzalez states she wants to continue to see if patient improves before making any decisions. Vi Gonzalez states she spoke to nursing home and was informed that patient's decline came on quick (within one week) and with that information, she feels more time is necessary before further deciding on goals of care. Vi Gonzalez asks that, if possible, for someone to be in room during meals to assist with feeding and encourage intake. RN updated. Support provided and questions answered.

## 2020-06-19 NOTE — PROGRESS NOTES
Department of Internal [place] [facility] Bean M.D.  Progress Note      SUBJECTIVE: Answers in one-word answers suggest she likes ice water    OBJECTIVE morbidly obese abdomen with pasty skin; she has lost her eyebrows since the last time I saw her approximately 2 years ago;    Medications    Current Facility-Administered Medications: QUEtiapine (SEROQUEL) tablet 12.5 mg, 12.5 mg, Oral, Daily  warfarin (COUMADIN) tablet 1 mg, 1 mg, Oral, Daily  polyethylene glycol (GLYCOLAX) packet 17 g, 17 g, Oral, BID  acetaminophen (TYLENOL) tablet 650 mg, 650 mg, Oral, Q4H PRN  albuterol (PROVENTIL) nebulizer solution 2.5 mg, 2.5 mg, Nebulization, Q6H PRN  vitamin C (ASCORBIC ACID) tablet 500 mg, 500 mg, Oral, Daily  bisacodyl (DULCOLAX) suppository 10 mg, 10 mg, Rectal, Daily PRN  calcium carbonate (TUMS) chewable tablet 1,000 mg, 2 tablet, Oral, Q4H PRN  divalproex (DEPAKOTE) DR tablet 125 mg, 125 mg, Oral, BID  magnesium citrate solution 150 mL, 150 mL, Oral, 4x Daily PRN  magnesium hydroxide (MILK OF MAGNESIA) 400 MG/5ML suspension 30 mL, 30 mL, Oral, Daily PRN  memantine (NAMENDA) tablet 10 mg, 10 mg, Oral, BID  miconazole (MICOTIN) 2 % powder, , Topical, BID  pantoprazole (PROTONIX) tablet 40 mg, 40 mg, Oral, Every Other Day  promethazine (PHENERGAN) tablet 25 mg, 25 mg, Oral, 4x Daily PRN  sertraline (ZOLOFT) tablet 100 mg, 100 mg, Oral, Daily  atorvastatin (LIPITOR) tablet 10 mg, 10 mg, Oral, Daily  sodium chloride flush 0.9 % injection 10 mL, 10 mL, Intravenous, 2 times per day  sodium chloride flush 0.9 % injection 10 mL, 10 mL, Intravenous, PRN  acetaminophen (TYLENOL) tablet 650 mg, 650 mg, Oral, Q6H PRN **OR** acetaminophen (TYLENOL) suppository 650 mg, 650 mg, Rectal, Q6H PRN  0.9 % sodium chloride infusion, , Intravenous, Continuous  insulin lispro (HUMALOG) injection vial 0-12 Units, 0-12 Units, Subcutaneous, TID WC  glucose (GLUTOSE) 40 % oral gel 15 g, 15 g, Oral, PRN  dextrose 50 % IV behavioral disturbance (HCC)  Plan: Decrease Seroquel    Type 2 diabetes mellitus ([address]  Plan: Stop long term insulin as she is not eating enough    Acute cystitis without hematuria  Plan: Continue on Cipro    Evaluate thyroid status      Electronically signed by Lexa Luo MD on 6/19/2020 at 7:29 AM

## 2020-06-19 NOTE — CARE COORDINATION
I spoke to Delmer at [facility] and the patient is a longterm bed hold there and can return at any time over the weekend. Her envelope is done in the blue folder. Palliative care is on andtdid discuss hospice  With hc poa as the patient is not eating or drinking and Mere Karol wishes to continue [facility] and is not ready to discuss hospice  but will continue to talk to Mere Roberson about Hospice secondary to her condition. No need for pt ot updates.  I will follow

## 2020-06-20 NOTE — DISCHARGE INSTR - COC
Continuity of Care Form    Patient Name: Harlan Garrido   :  1940  MRN:  [id number]    Admit date:  2020  Discharge date:      Code Status Order: [facility]   Advance Directives:   [facility] Documentation     Date/Time Healthcare Directive Type of Healthcare Directive Copy in [address] [facility] [address] Agent's Name Healthcare Agent's Phone Number    20  No, patient does not have an advance directive for healthcare treatment -- -- -- -- --          Admitting Physician:  Josephine Almanza MD  PCP: Josephine Almanza MD    Discharging Nurse: [facility] Unit/Room#: 8873/7709-T  Discharging Unit Phone Number: [phone]    Emergency Contact:   Extended Emergency Contact Information  Primary Emergency Contact: [address] Phone: [phone]  Mobile Phone: [phone]  Relation: Other    Past Surgical History:  Past Surgical History:   Procedure Laterality Date    KNEE ARTHROPLASTY Left 2014    with Dr. Ondina Sanford  2014    [place]       Immunization History: There is no immunization history on file for this patient.     Active Problems:  Patient Active Problem List   Diagnosis Code    Primary osteoarthritis of left knee M17.12    Acute kidney injury ([address] N17.9    Alzheimer's dementia with behavioral disturbance ([address] G30.9, F02.81    Type 2 diabetes mellitus ([address] E11.9    Dehydration E86.0    Essential hypertension I10    Acute cystitis without hematuria N30.00       Isolation/Infection:   Isolation          No Isolation        Patient Infection Status     Infection Onset Added Last Indicated Last Indicated By Review Planned Expiration Resolved Resolved By    None active    Resolved    COVID-19 Rule Out 20 COVID-19 (Ordered)   20 Rule-Out Test Resulted          Nurse Assessment:  Last Vital Signs: BP (!) 158/78   Pulse 68   Temp 97.9 °F Falls    Impairments/Disabilities:      None    Nutrition Therapy:  Current Nutrition Therapy:   - Oral Diet:  Carb Control 4 carbs/meal (1800kcals/day)    Routes of Feeding: Oral  Liquids: Thin Liquids  Daily Fluid Restriction: no  Last Modified Barium Swallow with Video (Video Swallowing Test): not done    Treatments at the Time of Hospital Discharge:   Respiratory Treatments: none  Oxygen Therapy:  is not on home oxygen therapy. Ventilator:    - No ventilator support    Rehab Therapies: Physical Therapy and Occupational Therapy  Weight Bearing Status/Restrictions: No weight bearing restirctions  Other Medical Equipment (for information only, NOT a DME order):  wheelchair and hospital bed  Other Treatments:     Patient's personal belongings (please select all that are sent with patient):  None    RN SIGNATURE:  Electronically signed by Israel Elaine RN on 6/20/20 at 7:30 PM EDT    CASE MANAGEMENT/SOCIAL WORK SECTION    Inpatient Status Date: ***    Readmission Risk Assessment Score:  Readmission Risk              Risk of Unplanned Readmission:        22           Discharging to Facility/ Agency   · Name:   · Address:  · Phone:  · Fax:    Dialysis Facility (if applicable)   · Name:  · Address:  · Dialysis Schedule:  · Phone:  · Fax:    / signature: {Esignature:[id number]}    PHYSICIAN SECTION    Prognosis: {Prognosis:1675851124}    Condition at Discharge: [facility] Patient Condition:433752189}    Rehab Potential (if transferring to Rehab): {Prognosis:5443821592}    Recommended Labs or Other Treatments After Discharge: ***    Physician Certification: I certify the above information and transfer of Candida Ortiz  is necessary for the continuing treatment of the diagnosis listed and that she requires {Admit to Appropriate Level of Care:37387} for {GREATER/LESS:912146586} 30 days.      Update Admission H&P: {[facility] DME Changes in [facility]:589092734}    PHYSICIAN SIGNATURE:  {Esignature:[id number]}

## 2020-07-16 PROBLEM — E86.0 DEHYDRATION: Status: RESOLVED | Noted: 2020-01-01 | Resolved: 2020-01-01

## 2020-10-14 ENCOUNTER — TELEPHONE (OUTPATIENT)
Dept: FAMILY MEDICINE CLINIC | Age: 80
End: 2020-10-14

## 2020-10-14 NOTE — TELEPHONE ENCOUNTER
Zach Kessler, [facility], called to ask if Dr. Janet Bai will sign Death Certificate. Patient was at [facility] and passed away on 10/11/20. Please inform and if so, should DC be hand delivered, or can it be emailed. If email, please provide email address.